# Patient Record
Sex: FEMALE | Race: OTHER | HISPANIC OR LATINO
[De-identification: names, ages, dates, MRNs, and addresses within clinical notes are randomized per-mention and may not be internally consistent; named-entity substitution may affect disease eponyms.]

---

## 2018-07-20 ENCOUNTER — APPOINTMENT (OUTPATIENT)
Dept: VASCULAR SURGERY | Facility: CLINIC | Age: 58
End: 2018-07-20
Payer: MEDICARE

## 2018-07-20 PROCEDURE — 93971 EXTREMITY STUDY: CPT

## 2018-07-20 PROCEDURE — 99204 OFFICE O/P NEW MOD 45 MIN: CPT | Mod: 25

## 2018-12-05 ENCOUNTER — APPOINTMENT (OUTPATIENT)
Dept: ORTHOPEDIC SURGERY | Facility: CLINIC | Age: 58
End: 2018-12-05
Payer: MEDICARE

## 2018-12-05 VITALS
WEIGHT: 200 LBS | DIASTOLIC BLOOD PRESSURE: 87 MMHG | BODY MASS INDEX: 32.14 KG/M2 | SYSTOLIC BLOOD PRESSURE: 141 MMHG | HEIGHT: 66 IN

## 2018-12-05 DIAGNOSIS — M22.8X1 OTHER DISORDERS OF PATELLA, RIGHT KNEE: ICD-10-CM

## 2018-12-05 PROCEDURE — 99203 OFFICE O/P NEW LOW 30 MIN: CPT

## 2018-12-05 PROCEDURE — 73564 X-RAY EXAM KNEE 4 OR MORE: CPT | Mod: LT

## 2018-12-11 ENCOUNTER — APPOINTMENT (OUTPATIENT)
Dept: ORTHOPEDIC SURGERY | Facility: CLINIC | Age: 58
End: 2018-12-11

## 2019-03-18 ENCOUNTER — FORM ENCOUNTER (OUTPATIENT)
Age: 59
End: 2019-03-18

## 2019-03-19 ENCOUNTER — OUTPATIENT (OUTPATIENT)
Dept: OUTPATIENT SERVICES | Facility: HOSPITAL | Age: 59
LOS: 1 days | End: 2019-03-19
Payer: MEDICARE

## 2019-03-19 ENCOUNTER — APPOINTMENT (OUTPATIENT)
Dept: ORTHOPEDIC SURGERY | Facility: CLINIC | Age: 59
End: 2019-03-19
Payer: MEDICARE

## 2019-03-19 DIAGNOSIS — Z96.653 PRESENCE OF ARTIFICIAL KNEE JOINT, BILATERAL: ICD-10-CM

## 2019-03-19 DIAGNOSIS — Z96.659 PAIN DUE TO INTERNAL ORTHOPEDIC PROSTHETIC DEVICES, IMPLANTS AND GRAFTS, INITIAL ENCOUNTER: ICD-10-CM

## 2019-03-19 DIAGNOSIS — T84.84XA PAIN DUE TO INTERNAL ORTHOPEDIC PROSTHETIC DEVICES, IMPLANTS AND GRAFTS, INITIAL ENCOUNTER: ICD-10-CM

## 2019-03-19 DIAGNOSIS — M25.461 EFFUSION, RIGHT KNEE: ICD-10-CM

## 2019-03-19 DIAGNOSIS — Z01.818 ENCOUNTER FOR OTHER PREPROCEDURAL EXAMINATION: ICD-10-CM

## 2019-03-19 LAB
ANION GAP SERPL CALC-SCNC: 13 MMOL/L — SIGNIFICANT CHANGE UP (ref 5–17)
APPEARANCE UR: CLEAR — SIGNIFICANT CHANGE UP
APTT BLD: 29.3 SEC — SIGNIFICANT CHANGE UP (ref 27.5–36.3)
B PERT IGG+IGM PNL SER: SIGNIFICANT CHANGE UP
BACTERIA # UR AUTO: PRESENT /HPF
BILIRUB UR-MCNC: NEGATIVE — SIGNIFICANT CHANGE UP
BUN SERPL-MCNC: 18 MG/DL — SIGNIFICANT CHANGE UP (ref 7–23)
CALCIUM SERPL-MCNC: 10 MG/DL — SIGNIFICANT CHANGE UP (ref 8.4–10.5)
CHLORIDE SERPL-SCNC: 101 MMOL/L — SIGNIFICANT CHANGE UP (ref 96–108)
CO2 SERPL-SCNC: 27 MMOL/L — SIGNIFICANT CHANGE UP (ref 22–31)
COLOR FLD: YELLOW — SIGNIFICANT CHANGE UP
COLOR SPEC: YELLOW — SIGNIFICANT CHANGE UP
CREAT SERPL-MCNC: 0.78 MG/DL — SIGNIFICANT CHANGE UP (ref 0.5–1.3)
CRP SERPL-MCNC: 0.35 MG/DL — SIGNIFICANT CHANGE UP (ref 0–0.4)
DIFF PNL FLD: NEGATIVE — SIGNIFICANT CHANGE UP
EPI CELLS # UR: ABNORMAL /HPF (ref 0–5)
ERYTHROCYTE [SEDIMENTATION RATE] IN BLOOD: 17 MM/HR — SIGNIFICANT CHANGE UP
FLUID INTAKE SUBSTANCE CLASS: SIGNIFICANT CHANGE UP
FLUID SEGMENTED GRANULOCYTES: 40 % — SIGNIFICANT CHANGE UP
GLUCOSE SERPL-MCNC: 141 MG/DL — HIGH (ref 70–99)
GLUCOSE UR QL: NEGATIVE — SIGNIFICANT CHANGE UP
HBA1C BLD-MCNC: 5.6 % — SIGNIFICANT CHANGE UP (ref 4–5.6)
HCT VFR BLD CALC: 41.9 % — SIGNIFICANT CHANGE UP (ref 34.5–45)
HGB BLD-MCNC: 13.3 G/DL — SIGNIFICANT CHANGE UP (ref 11.5–15.5)
INR BLD: 1.02 — SIGNIFICANT CHANGE UP (ref 0.88–1.16)
KETONES UR-MCNC: NEGATIVE — SIGNIFICANT CHANGE UP
LEUKOCYTE ESTERASE UR-ACNC: NEGATIVE — SIGNIFICANT CHANGE UP
LYMPHOCYTES # FLD: 53 % — SIGNIFICANT CHANGE UP
MCHC RBC-ENTMCNC: 28.4 PG — SIGNIFICANT CHANGE UP (ref 27–34)
MCHC RBC-ENTMCNC: 31.7 GM/DL — LOW (ref 32–36)
MCV RBC AUTO: 89.3 FL — SIGNIFICANT CHANGE UP (ref 80–100)
MONOS+MACROS # FLD: 7 % — SIGNIFICANT CHANGE UP
NITRITE UR-MCNC: NEGATIVE — SIGNIFICANT CHANGE UP
NRBC # BLD: 0 /100 WBCS — SIGNIFICANT CHANGE UP (ref 0–0)
PH UR: 6.5 — SIGNIFICANT CHANGE UP (ref 5–8)
PLATELET # BLD AUTO: 350 K/UL — SIGNIFICANT CHANGE UP (ref 150–400)
POTASSIUM SERPL-MCNC: 4 MMOL/L — SIGNIFICANT CHANGE UP (ref 3.5–5.3)
POTASSIUM SERPL-SCNC: 4 MMOL/L — SIGNIFICANT CHANGE UP (ref 3.5–5.3)
PROT UR-MCNC: ABNORMAL MG/DL
PROTHROM AB SERPL-ACNC: 11.5 SEC — SIGNIFICANT CHANGE UP (ref 10–12.9)
RBC # BLD: 4.69 M/UL — SIGNIFICANT CHANGE UP (ref 3.8–5.2)
RBC # FLD: 13.4 % — SIGNIFICANT CHANGE UP (ref 10.3–14.5)
RBC CASTS # UR COMP ASSIST: < 5 /HPF — SIGNIFICANT CHANGE UP
RCV VOL RI: 2000 /UL — HIGH (ref 0–5)
SODIUM SERPL-SCNC: 141 MMOL/L — SIGNIFICANT CHANGE UP (ref 135–145)
SP GR SPEC: 1.01 — SIGNIFICANT CHANGE UP (ref 1–1.03)
SPECIMEN SOURCE FLD: SIGNIFICANT CHANGE UP
SYNOVIAL CRYSTALS CLARITY: SIGNIFICANT CHANGE UP
SYNOVIAL CRYSTALS COLOR: YELLOW
SYNOVIAL CRYSTALS ID: SIGNIFICANT CHANGE UP
SYNOVIAL CRYSTALS TUBE: SIGNIFICANT CHANGE UP
TOTAL NUCLEATED CELL COUNT, BODY FLUID: 7071 /UL — HIGH (ref 0–5)
TUBE TYPE: SIGNIFICANT CHANGE UP
UROBILINOGEN FLD QL: 1 E.U./DL — SIGNIFICANT CHANGE UP
WBC # BLD: 8.62 K/UL — SIGNIFICANT CHANGE UP (ref 3.8–10.5)
WBC # FLD AUTO: 8.62 K/UL — SIGNIFICANT CHANGE UP (ref 3.8–10.5)
WBC UR QL: < 5 /HPF — SIGNIFICANT CHANGE UP

## 2019-03-19 PROCEDURE — 93005 ELECTROCARDIOGRAM TRACING: CPT

## 2019-03-19 PROCEDURE — 85730 THROMBOPLASTIN TIME PARTIAL: CPT

## 2019-03-19 PROCEDURE — 86140 C-REACTIVE PROTEIN: CPT

## 2019-03-19 PROCEDURE — 85610 PROTHROMBIN TIME: CPT

## 2019-03-19 PROCEDURE — 20610 DRAIN/INJ JOINT/BURSA W/O US: CPT | Mod: LT

## 2019-03-19 PROCEDURE — 71046 X-RAY EXAM CHEST 2 VIEWS: CPT | Mod: 26

## 2019-03-19 PROCEDURE — 81001 URINALYSIS AUTO W/SCOPE: CPT

## 2019-03-19 PROCEDURE — 80048 BASIC METABOLIC PNL TOTAL CA: CPT

## 2019-03-19 PROCEDURE — 93010 ELECTROCARDIOGRAM REPORT: CPT

## 2019-03-19 PROCEDURE — 72170 X-RAY EXAM OF PELVIS: CPT | Mod: 26

## 2019-03-19 PROCEDURE — 87086 URINE CULTURE/COLONY COUNT: CPT

## 2019-03-19 PROCEDURE — 73564 X-RAY EXAM KNEE 4 OR MORE: CPT | Mod: 26,50

## 2019-03-19 PROCEDURE — 83036 HEMOGLOBIN GLYCOSYLATED A1C: CPT

## 2019-03-19 PROCEDURE — 72170 X-RAY EXAM OF PELVIS: CPT

## 2019-03-19 PROCEDURE — 73564 X-RAY EXAM KNEE 4 OR MORE: CPT

## 2019-03-19 PROCEDURE — 71046 X-RAY EXAM CHEST 2 VIEWS: CPT

## 2019-03-19 PROCEDURE — 99214 OFFICE O/P EST MOD 30 MIN: CPT | Mod: 25

## 2019-03-19 PROCEDURE — 85652 RBC SED RATE AUTOMATED: CPT

## 2019-03-19 PROCEDURE — 85027 COMPLETE CBC AUTOMATED: CPT

## 2019-03-19 NOTE — END OF VISIT
[FreeTextEntry3] : All medical record entries made by the Scribe were at my, Dr. Ortega's, discretion and personally dictated by me on 03/19/2019. I have reviewed the chart and agree that the record accurately reflects my personal performance of the history, physical exam, assessment and plan. I have also personally directed, reviewed and agreed to the chart.

## 2019-03-19 NOTE — HISTORY OF PRESENT ILLNESS
[de-identified] : 57 y/o F presents today for evaluation of bilateral knee pain s/p bilateral TKR about 10 years ago. The right knee was initially more painful, but patient notes that the left knee has become more painful since January 2019. She reports moderate to severe bilateral knee pain as well as instability on stairs. In the right knee she reports mild stiffness and in the left knee, she reports severe stiffness. She can walk about 2 blocks with a moderate limp and a cane and uses a rail to ascend and descend stairs. She denies fevers, chills. \par Of note, patient has rheumatoid arthritis for which she takes methotrexate and Enbrel. She is allergic to penicillin. Patient has HTN and HLD and reports that they are currently under control.

## 2019-03-19 NOTE — ADDENDUM
[FreeTextEntry1] : This note was written by Carmelina Hooper on 03/19/2019  acting as scribe for Dr. Ortega and DEVORAH Almanzar.\par

## 2019-03-19 NOTE — PROCEDURE
[Aspiration] : Aspiration [Left] : of the left [Knee Joint] : knee joint [Diagnostic] : Diagnostic [Patient] : patient [Risk] : risk [Benefits] : benefits [Alternatives] : alternatives [Bleeding] : bleeding [Allergic Reaction] : allergic reaction [Infection] : infection [Verbal Consent Obtained] : verbal consent was obtained prior to the procedure [Alcohol] : Alcohol [Betadine] : Betadine [Ethyl Chloride Spray] : ethyl chloride spray was used as a topical anesthetic [Lateral] : lateral [18] : an 18-gauge [___ mL Fluid] : [unfilled] mL of [Same Needle/New Syringe] : the syringe was changed and the same needle was left in place and [Bandage Applied] : a bandage [Tolerated Well] : The patient tolerated the procedure well [None] : none [No Strenuous Activity___day(s)] : avoid strenuous activity for [unfilled] day(s) [PRN] : as needed

## 2019-03-19 NOTE — DISCUSSION/SUMMARY
[de-identified] : Ms. Diaz presents with bilateral knee pain and instability s/p bilateral TKR. Based on X-ray imaging and physical exam, I believe the plastic in her bilateral knee implants is wearing out and causing inflammation as a response to the plastic debris. I informed her that in the left knee, there is evidence of swelling and inflammation whereas in the right knee, there is evidence that the implant is already starting to loosen from the bone. I believe that both knees will eventually need surgical revisions. \par \par On physical exam, patient's left knee is slightly warm to the touch and she reports that her left knee pain has recently and quickly increased in severity. I aspirated 17mL of fluid from the left knee and sent it to the lab. I also sent the patient for blood tests to rule out infection. If the blood tests suggest infection, I will also aspirate the right knee to check for infection there when she returns to the office in two weeks\par \par I informed the patient that we will make a plan after her aspiration and blood tests results, but I believe she will need a revision of left total knee replacement. We may discuss which parts of the left TKR to replace but the ultimate decision will be made in the operating room.\par \par Ms. Diaz currently takes Enbrel for rheumatoid arthritis and I reminded her that because this medication can suppress one's immune system.  As she will need an operation she will have to stop taking this medication for one month before surgery in order to prevent infection. Enbrel should not be resumed until approximately 1 month after surgery.\par \par Follow up in 2 weeks to review lab results and make a surgical plan.

## 2019-03-19 NOTE — PHYSICAL EXAM
[de-identified] : Constitutional: Well appearing. No acute distress.\par Mental Status: Alert & oriented to person, place and time. Normal affect.\par Pulmonary: No respiratory distress. Normal chest excursion.\par \par Gait: Normal.\par Ambulatory assist devices: None.\par \par Cervical spine: Skin intact. No visible deformity. Painless active ROM without evident restriction.\par Bilateral upper extremities: Skin intact. No deformity. Painless active ROM without evident restriction.\par Thoracolumbar spine: No deformity. No tenderness. No radicular pain on passive straight leg raise bilaterally.\par Pelvis: No pelvic obliquity. No tenderness.\par Leg lengths: Equal.\par Bilateral Hips: No swelling or deformity. No focal tenderness. Painless and unrestricted range of motion. No crepitation.\par \par Right Knee:\par Skin intact.\par Well healed surgical scar.\par No erythema or ecchymosis.\par (+) effusion.\par Diffuse tenderness.\par Painless ROM from full extension to 105-110 degrees of flexion.\par Central patellar tracking.\par No crepitation.\par Moderate coronal laxity to varus and valgus stress. \par Laxity on drawer testing.\par Significant lateral lift off with varus stress and flexion.\par \par Left Knee:\par Skin intact.\par Well healed surgical scar.\par No erythema or ecchymosis.\par (+) effusion.\par No deformity.\par Diffuse tenderness.\par Painful ROM from full extension to 75-80 degrees of flexion. Tenseness and guarding.\par Some laxity, when guarding stops.\par Central patellar tracking.\par No crepitation.\par Slight warmth.\par \par Neurological: Intact distal crude touch sensation. Normal distal motor power. \par Cardiovascular: Palpable dorsalis pedis and posterior tibialis pulses. Brisk capillary refill. No peripheral edema.\par Lymphatics: No peripheral adenopathy appreciated. [de-identified] : X-ray imaging of the bilateral knees done here today shows bilateral total knee arthroplasties with evidence of synovitis and effusion, both femoral components slightly oversized, osteolysis around right tibial component.\par

## 2019-03-20 DIAGNOSIS — M25.462 EFFUSION, LEFT KNEE: ICD-10-CM

## 2019-03-20 LAB
CULTURE RESULTS: SIGNIFICANT CHANGE UP
GRAM STN FLD: SIGNIFICANT CHANGE UP
SPECIMEN SOURCE: SIGNIFICANT CHANGE UP

## 2019-03-22 LAB
CULTURE RESULTS: NO GROWTH — SIGNIFICANT CHANGE UP
SPECIMEN SOURCE: SIGNIFICANT CHANGE UP

## 2019-04-04 ENCOUNTER — APPOINTMENT (OUTPATIENT)
Dept: ORTHOPEDIC SURGERY | Facility: CLINIC | Age: 59
End: 2019-04-04

## 2019-04-22 ENCOUNTER — OUTPATIENT (OUTPATIENT)
Dept: OUTPATIENT SERVICES | Facility: HOSPITAL | Age: 59
LOS: 1 days | End: 2019-04-22
Payer: COMMERCIAL

## 2019-04-22 DIAGNOSIS — Z22.321 CARRIER OR SUSPECTED CARRIER OF METHICILLIN SUSCEPTIBLE STAPHYLOCOCCUS AUREUS: ICD-10-CM

## 2019-04-22 LAB
MRSA PCR RESULT.: NEGATIVE — SIGNIFICANT CHANGE UP
S AUREUS DNA NOSE QL NAA+PROBE: NEGATIVE — SIGNIFICANT CHANGE UP

## 2019-04-22 PROCEDURE — 87641 MR-STAPH DNA AMP PROBE: CPT

## 2019-04-25 VITALS
RESPIRATION RATE: 20 BRPM | WEIGHT: 199.3 LBS | HEART RATE: 76 BPM | HEIGHT: 66 IN | SYSTOLIC BLOOD PRESSURE: 117 MMHG | TEMPERATURE: 98 F | OXYGEN SATURATION: 98 % | DIASTOLIC BLOOD PRESSURE: 83 MMHG

## 2019-04-26 NOTE — PATIENT PROFILE ADULT - DO YOU FEEL UNSAFE AT HOME, WORK, OR SCHOOL?
47 y/o female with vaginal bleeding. Plan: transvaginal US, f/u OB/GYN, IV fluids. 45 y/o female with vaginal bleeding. Plan: transvaginal US, f/u OB/GYN, IV fluids.    Cleared by GYN Dr. Reeves for DC with DR. Fournier at [facility]. Maricel Domingo PA-C no

## 2019-04-26 NOTE — PATIENT PROFILE ADULT - NSPROEDALEARNPREF_GEN_A_NUR
individual instruction written material/individual instruction/verbal instruction/skill demonstration

## 2019-04-29 ENCOUNTER — APPOINTMENT (OUTPATIENT)
Dept: ORTHOPEDIC SURGERY | Facility: HOSPITAL | Age: 59
End: 2019-04-29

## 2019-04-30 PROBLEM — M06.9 RHEUMATOID ARTHRITIS, UNSPECIFIED: Chronic | Status: ACTIVE | Noted: 2019-04-26

## 2019-04-30 PROBLEM — J45.909 UNSPECIFIED ASTHMA, UNCOMPLICATED: Chronic | Status: ACTIVE | Noted: 2019-04-26

## 2019-04-30 PROBLEM — A04.8 OTHER SPECIFIED BACTERIAL INTESTINAL INFECTIONS: Chronic | Status: ACTIVE | Noted: 2019-04-26

## 2019-04-30 PROBLEM — F41.1 GENERALIZED ANXIETY DISORDER: Chronic | Status: ACTIVE | Noted: 2019-04-26

## 2019-04-30 PROBLEM — F32.9 MAJOR DEPRESSIVE DISORDER, SINGLE EPISODE, UNSPECIFIED: Chronic | Status: ACTIVE | Noted: 2019-04-26

## 2019-04-30 PROBLEM — E78.5 HYPERLIPIDEMIA, UNSPECIFIED: Chronic | Status: ACTIVE | Noted: 2019-04-26

## 2019-04-30 PROBLEM — K22.70 BARRETT'S ESOPHAGUS WITHOUT DYSPLASIA: Chronic | Status: ACTIVE | Noted: 2019-04-26

## 2019-04-30 PROBLEM — I10 ESSENTIAL (PRIMARY) HYPERTENSION: Chronic | Status: ACTIVE | Noted: 2019-04-26

## 2019-05-01 ENCOUNTER — INPATIENT (INPATIENT)
Facility: HOSPITAL | Age: 59
LOS: 2 days | Discharge: HOME HEALTH | DRG: 468 | End: 2019-05-04
Attending: ORTHOPAEDIC SURGERY | Admitting: ORTHOPAEDIC SURGERY
Payer: MEDICARE

## 2019-05-01 ENCOUNTER — MEDICATION RENEWAL (OUTPATIENT)
Age: 59
End: 2019-05-01

## 2019-05-01 ENCOUNTER — APPOINTMENT (OUTPATIENT)
Dept: ORTHOPEDIC SURGERY | Facility: HOSPITAL | Age: 59
End: 2019-05-01
Payer: MEDICARE

## 2019-05-01 DIAGNOSIS — J45.909 UNSPECIFIED ASTHMA, UNCOMPLICATED: ICD-10-CM

## 2019-05-01 DIAGNOSIS — F41.1 GENERALIZED ANXIETY DISORDER: ICD-10-CM

## 2019-05-01 DIAGNOSIS — F32.9 MAJOR DEPRESSIVE DISORDER, SINGLE EPISODE, UNSPECIFIED: ICD-10-CM

## 2019-05-01 DIAGNOSIS — E78.5 HYPERLIPIDEMIA, UNSPECIFIED: ICD-10-CM

## 2019-05-01 DIAGNOSIS — Z90.49 ACQUIRED ABSENCE OF OTHER SPECIFIED PARTS OF DIGESTIVE TRACT: Chronic | ICD-10-CM

## 2019-05-01 DIAGNOSIS — I10 ESSENTIAL (PRIMARY) HYPERTENSION: ICD-10-CM

## 2019-05-01 DIAGNOSIS — Z90.710 ACQUIRED ABSENCE OF BOTH CERVIX AND UTERUS: Chronic | ICD-10-CM

## 2019-05-01 DIAGNOSIS — M06.9 RHEUMATOID ARTHRITIS, UNSPECIFIED: ICD-10-CM

## 2019-05-01 DIAGNOSIS — Z41.9 ENCOUNTER FOR PROCEDURE FOR PURPOSES OTHER THAN REMEDYING HEALTH STATE, UNSPECIFIED: Chronic | ICD-10-CM

## 2019-05-01 DIAGNOSIS — Z96.659 PRESENCE OF UNSPECIFIED ARTIFICIAL KNEE JOINT: Chronic | ICD-10-CM

## 2019-05-01 LAB
HCT VFR BLD CALC: 40.4 % — SIGNIFICANT CHANGE UP (ref 34.5–45)
HGB BLD-MCNC: 12.9 G/DL — SIGNIFICANT CHANGE UP (ref 11.5–15.5)
MCHC RBC-ENTMCNC: 28.4 PG — SIGNIFICANT CHANGE UP (ref 27–34)
MCHC RBC-ENTMCNC: 31.9 GM/DL — LOW (ref 32–36)
MCV RBC AUTO: 88.8 FL — SIGNIFICANT CHANGE UP (ref 80–100)
NRBC # BLD: 0 /100 WBCS — SIGNIFICANT CHANGE UP (ref 0–0)
PLATELET # BLD AUTO: 277 K/UL — SIGNIFICANT CHANGE UP (ref 150–400)
RBC # BLD: 4.55 M/UL — SIGNIFICANT CHANGE UP (ref 3.8–5.2)
RBC # FLD: 13.3 % — SIGNIFICANT CHANGE UP (ref 10.3–14.5)
WBC # BLD: 6.43 K/UL — SIGNIFICANT CHANGE UP (ref 3.8–10.5)
WBC # FLD AUTO: 6.43 K/UL — SIGNIFICANT CHANGE UP (ref 3.8–10.5)

## 2019-05-01 PROCEDURE — 73560 X-RAY EXAM OF KNEE 1 OR 2: CPT | Mod: 26,LT

## 2019-05-01 PROCEDURE — 27486 REVISE/REPLACE KNEE JOINT: CPT | Mod: 52,LT

## 2019-05-01 PROCEDURE — 27486 REVISE/REPLACE KNEE JOINT: CPT | Mod: AS,LT

## 2019-05-01 PROCEDURE — 27599 UNLISTED PX FEMUR/KNEE: CPT | Mod: LT

## 2019-05-01 RX ORDER — SERTRALINE 25 MG/1
1 TABLET, FILM COATED ORAL
Qty: 0 | Refills: 0 | COMMUNITY

## 2019-05-01 RX ORDER — LOSARTAN POTASSIUM 100 MG/1
50 TABLET, FILM COATED ORAL DAILY
Qty: 0 | Refills: 0 | Status: DISCONTINUED | OUTPATIENT
Start: 2019-05-02 | End: 2019-05-04

## 2019-05-01 RX ORDER — DOCUSATE SODIUM 100 MG
100 CAPSULE ORAL THREE TIMES A DAY
Qty: 0 | Refills: 0 | Status: DISCONTINUED | OUTPATIENT
Start: 2019-05-01 | End: 2019-05-04

## 2019-05-01 RX ORDER — LOSARTAN/HYDROCHLOROTHIAZIDE 100MG-25MG
1 TABLET ORAL
Qty: 0 | Refills: 0 | COMMUNITY

## 2019-05-01 RX ORDER — KETOROLAC TROMETHAMINE 30 MG/ML
15 SYRINGE (ML) INJECTION EVERY 6 HOURS
Qty: 0 | Refills: 0 | Status: DISCONTINUED | OUTPATIENT
Start: 2019-05-01 | End: 2019-05-03

## 2019-05-01 RX ORDER — ATORVASTATIN CALCIUM 80 MG/1
20 TABLET, FILM COATED ORAL AT BEDTIME
Qty: 0 | Refills: 0 | Status: DISCONTINUED | OUTPATIENT
Start: 2019-05-02 | End: 2019-05-04

## 2019-05-01 RX ORDER — HYDROCHLOROTHIAZIDE 25 MG
12.5 TABLET ORAL DAILY
Qty: 0 | Refills: 0 | Status: DISCONTINUED | OUTPATIENT
Start: 2019-05-02 | End: 2019-05-04

## 2019-05-01 RX ORDER — POLYETHYLENE GLYCOL 3350 17 G/17G
17 POWDER, FOR SOLUTION ORAL DAILY
Qty: 0 | Refills: 0 | Status: DISCONTINUED | OUTPATIENT
Start: 2019-05-01 | End: 2019-05-04

## 2019-05-01 RX ORDER — ERGOCALCIFEROL 1.25 MG/1
1 CAPSULE ORAL
Qty: 0 | Refills: 0 | COMMUNITY

## 2019-05-01 RX ORDER — SODIUM CHLORIDE 9 MG/ML
1000 INJECTION, SOLUTION INTRAVENOUS
Qty: 0 | Refills: 0 | Status: DISCONTINUED | OUTPATIENT
Start: 2019-05-01 | End: 2019-05-04

## 2019-05-01 RX ORDER — ALBUTEROL 90 UG/1
2 AEROSOL, METERED ORAL
Qty: 0 | Refills: 0 | COMMUNITY

## 2019-05-01 RX ORDER — ALBUTEROL 90 UG/1
2 AEROSOL, METERED ORAL EVERY 6 HOURS
Qty: 0 | Refills: 0 | Status: DISCONTINUED | OUTPATIENT
Start: 2019-05-02 | End: 2019-05-04

## 2019-05-01 RX ORDER — ACETAMINOPHEN 500 MG
650 TABLET ORAL EVERY 6 HOURS
Qty: 0 | Refills: 0 | Status: DISCONTINUED | OUTPATIENT
Start: 2019-05-01 | End: 2019-05-04

## 2019-05-01 RX ORDER — MORPHINE SULFATE 50 MG/1
4 CAPSULE, EXTENDED RELEASE ORAL
Qty: 0 | Refills: 0 | Status: COMPLETED | OUTPATIENT
Start: 2019-05-01 | End: 2019-05-01

## 2019-05-01 RX ORDER — SENNA PLUS 8.6 MG/1
2 TABLET ORAL AT BEDTIME
Qty: 0 | Refills: 0 | Status: DISCONTINUED | OUTPATIENT
Start: 2019-05-01 | End: 2019-05-04

## 2019-05-01 RX ORDER — OXYCODONE HYDROCHLORIDE 5 MG/1
10 TABLET ORAL EVERY 4 HOURS
Qty: 0 | Refills: 0 | Status: DISCONTINUED | OUTPATIENT
Start: 2019-05-02 | End: 2019-05-04

## 2019-05-01 RX ORDER — OXYCODONE HYDROCHLORIDE 5 MG/1
5 TABLET ORAL EVERY 4 HOURS
Qty: 0 | Refills: 0 | Status: DISCONTINUED | OUTPATIENT
Start: 2019-05-01 | End: 2019-05-04

## 2019-05-01 RX ORDER — ROSUVASTATIN CALCIUM 5 MG/1
1 TABLET ORAL
Qty: 0 | Refills: 0 | COMMUNITY

## 2019-05-01 RX ORDER — PANTOPRAZOLE 40 MG/1
40 TABLET, DELAYED RELEASE ORAL DAILY
Qty: 30 | Refills: 0 | Status: ACTIVE | COMMUNITY
Start: 2019-05-01 | End: 1900-01-01

## 2019-05-01 RX ORDER — CELECOXIB 200 MG/1
200 CAPSULE ORAL
Qty: 0 | Refills: 0 | Status: DISCONTINUED | OUTPATIENT
Start: 2019-05-03 | End: 2019-05-04

## 2019-05-01 RX ORDER — ONDANSETRON 8 MG/1
4 TABLET, FILM COATED ORAL EVERY 6 HOURS
Qty: 0 | Refills: 0 | Status: DISCONTINUED | OUTPATIENT
Start: 2019-05-01 | End: 2019-05-04

## 2019-05-01 RX ORDER — MAGNESIUM HYDROXIDE 400 MG/1
30 TABLET, CHEWABLE ORAL DAILY
Qty: 0 | Refills: 0 | Status: DISCONTINUED | OUTPATIENT
Start: 2019-05-01 | End: 2019-05-04

## 2019-05-01 RX ORDER — CELECOXIB 200 MG/1
400 CAPSULE ORAL ONCE
Qty: 0 | Refills: 0 | Status: COMPLETED | OUTPATIENT
Start: 2019-05-01 | End: 2019-05-01

## 2019-05-01 RX ORDER — ACETAMINOPHEN 500 MG
1000 TABLET ORAL ONCE
Qty: 0 | Refills: 0 | Status: COMPLETED | OUTPATIENT
Start: 2019-05-01 | End: 2019-05-01

## 2019-05-01 RX ORDER — APREPITANT 80 MG/1
40 CAPSULE ORAL ONCE
Qty: 0 | Refills: 0 | Status: COMPLETED | OUTPATIENT
Start: 2019-05-01 | End: 2019-05-01

## 2019-05-01 RX ORDER — BUPIVACAINE 13.3 MG/ML
20 INJECTION, SUSPENSION, LIPOSOMAL INFILTRATION ONCE
Qty: 0 | Refills: 0 | Status: DISCONTINUED | OUTPATIENT
Start: 2019-05-01 | End: 2019-05-04

## 2019-05-01 RX ORDER — ASPIRIN/CALCIUM CARB/MAGNESIUM 324 MG
325 TABLET ORAL
Qty: 0 | Refills: 0 | Status: DISCONTINUED | OUTPATIENT
Start: 2019-05-01 | End: 2019-05-04

## 2019-05-01 RX ORDER — ERGOCALCIFEROL 1.25 MG/1
50000 CAPSULE ORAL
Qty: 0 | Refills: 0 | Status: DISCONTINUED | OUTPATIENT
Start: 2019-05-02 | End: 2019-05-04

## 2019-05-01 RX ORDER — PANTOPRAZOLE SODIUM 20 MG/1
40 TABLET, DELAYED RELEASE ORAL
Qty: 0 | Refills: 0 | Status: DISCONTINUED | OUTPATIENT
Start: 2019-05-01 | End: 2019-05-04

## 2019-05-01 RX ORDER — SODIUM CHLORIDE 9 MG/ML
3 INJECTION INTRAMUSCULAR; INTRAVENOUS; SUBCUTANEOUS EVERY 8 HOURS
Qty: 0 | Refills: 0 | Status: DISCONTINUED | OUTPATIENT
Start: 2019-05-01 | End: 2019-05-04

## 2019-05-01 RX ORDER — SERTRALINE 25 MG/1
100 TABLET, FILM COATED ORAL DAILY
Qty: 0 | Refills: 0 | Status: DISCONTINUED | OUTPATIENT
Start: 2019-05-02 | End: 2019-05-04

## 2019-05-01 RX ORDER — MORPHINE SULFATE 50 MG/1
2 CAPSULE, EXTENDED RELEASE ORAL EVERY 4 HOURS
Qty: 0 | Refills: 0 | Status: DISCONTINUED | OUTPATIENT
Start: 2019-05-02 | End: 2019-05-04

## 2019-05-01 RX ADMIN — Medication 100 MILLIGRAM(S): at 23:03

## 2019-05-01 RX ADMIN — OXYCODONE HYDROCHLORIDE 5 MILLIGRAM(S): 5 TABLET ORAL at 21:07

## 2019-05-01 RX ADMIN — SODIUM CHLORIDE 100 MILLILITER(S): 9 INJECTION, SOLUTION INTRAVENOUS at 19:56

## 2019-05-01 RX ADMIN — SODIUM CHLORIDE 3 MILLILITER(S): 9 INJECTION INTRAMUSCULAR; INTRAVENOUS; SUBCUTANEOUS at 22:56

## 2019-05-01 RX ADMIN — CELECOXIB 400 MILLIGRAM(S): 200 CAPSULE ORAL at 13:10

## 2019-05-01 RX ADMIN — Medication 1000 MILLIGRAM(S): at 13:11

## 2019-05-01 RX ADMIN — APREPITANT 40 MILLIGRAM(S): 80 CAPSULE ORAL at 13:11

## 2019-05-01 RX ADMIN — Medication 15 MILLIGRAM(S): at 23:03

## 2019-05-01 RX ADMIN — Medication 15 MILLIGRAM(S): at 20:06

## 2019-05-01 RX ADMIN — MORPHINE SULFATE 4 MILLIGRAM(S): 50 CAPSULE, EXTENDED RELEASE ORAL at 20:50

## 2019-05-01 RX ADMIN — Medication 650 MILLIGRAM(S): at 23:03

## 2019-05-01 NOTE — H&P ADULT - NSHPLABSRESULTS_GEN_ALL_CORE
preop cbc/bmp/coags/ua wnl per medical clearance   EKG-   CXR- preop cbc/bmp/coags/ua wnl per medical clearance   EKG-  NSR, RSR in V1  CXR- wnl per medical clearance  nares negative

## 2019-05-01 NOTE — PHYSICAL THERAPY INITIAL EVALUATION ADULT - PLANNED THERAPY INTERVENTIONS, PT EVAL
ROM/neuromuscular re-education/balance training/bed mobility training/strengthening/transfer training/gait training

## 2019-05-01 NOTE — H&P ADULT - NSICDXPASTMEDICALHX_GEN_ALL_CORE_FT
PAST MEDICAL HISTORY:  Asthma     Sotomayor's esophagus     Generalized anxiety disorder     Helicobacter pylori (H. pylori) infection     HLD (hyperlipidemia)     HTN (hypertension)     Major depressive disorder     RA (rheumatoid arthritis)

## 2019-05-01 NOTE — PHYSICAL THERAPY INITIAL EVALUATION ADULT - CRITERIA FOR SKILLED THERAPEUTIC INTERVENTIONS
anticipated equipment needs at discharge/therapy frequency/anticipated discharge recommendation/impairments found/risk reduction/prevention/functional limitations in following categories/predicted duration of therapy intervention

## 2019-05-01 NOTE — PHYSICAL THERAPY INITIAL EVALUATION ADULT - GENERAL OBSERVATIONS, REHAB EVAL
Patient received semi-supine in no acute distress, +Telemetry, +SCDs, +IV, +Cryocuff. Cleared by MARIA LUISA Johnston.

## 2019-05-01 NOTE — H&P ADULT - HISTORY OF PRESENT ILLNESS
57yo f c/o left knee pain x   Presents today for elective REVISION LEFT TKR. 59yo f c/o left knee pain on/off since 2006. Pt. had original left tkr done at [facility] in 2006. Pt. c/o left knee pain, swelling, and giving out on her. Pt. went to see PMD in which xrays were performed and referred to orthopedist. Pt. eventually was referred to Dr. Ortega in which he aspirated left knee and was told the prosthesis was worn out. No infection from aspiration results. Denies any numbness/tingling in b/l les. Pt. is using cane assistance and can ambulate greater than 2 blocks after aspiration.   Presents today for elective REVISION LEFT TKR.

## 2019-05-01 NOTE — H&P ADULT - NSICDXPASTSURGICALHX_GEN_ALL_CORE_FT
PAST SURGICAL HISTORY:  S/P appendectomy     S/P cholecystectomy     S/P hysterectomy total    S/P knee replacement bilateral    Surgery, elective right shoulder surgery

## 2019-05-01 NOTE — PHYSICAL THERAPY INITIAL EVALUATION ADULT - PERTINENT HX OF CURRENT PROBLEM, REHAB EVAL
59yo f c/o left knee pain on/off since 2006. Pt. had original left tkr done at [facility] in 2006. Pt. c/o left knee pain, swelling, and giving out on her. Pt. went to see PMD in which xrays were performed and referred to orthopedist. Pt. eventually was referred to Dr. Ortega in which he aspirated left knee and was told the prosthesis was worn out. No infection from aspiration results. Denies any numbness/tingling in b/l les.

## 2019-05-01 NOTE — PHYSICAL THERAPY INITIAL EVALUATION ADULT - ADDITIONAL COMMENTS
Patient lives with spouse in apartment building with 27 steps to enter. Ambulates with SC recently. Denies falls.

## 2019-05-02 ENCOUNTER — MEDICATION RENEWAL (OUTPATIENT)
Age: 59
End: 2019-05-02

## 2019-05-02 ENCOUNTER — TRANSCRIPTION ENCOUNTER (OUTPATIENT)
Age: 59
End: 2019-05-02

## 2019-05-02 LAB
ANION GAP SERPL CALC-SCNC: 16 MMOL/L — SIGNIFICANT CHANGE UP (ref 5–17)
BUN SERPL-MCNC: 13 MG/DL — SIGNIFICANT CHANGE UP (ref 7–23)
CALCIUM SERPL-MCNC: 9.4 MG/DL — SIGNIFICANT CHANGE UP (ref 8.4–10.5)
CHLORIDE SERPL-SCNC: 103 MMOL/L — SIGNIFICANT CHANGE UP (ref 96–108)
CO2 SERPL-SCNC: 19 MMOL/L — LOW (ref 22–31)
CREAT SERPL-MCNC: 0.63 MG/DL — SIGNIFICANT CHANGE UP (ref 0.5–1.3)
GLUCOSE SERPL-MCNC: 210 MG/DL — HIGH (ref 70–99)
HCT VFR BLD CALC: 39.3 % — SIGNIFICANT CHANGE UP (ref 34.5–45)
HCV AB S/CO SERPL IA: 0.05 S/CO — SIGNIFICANT CHANGE UP
HCV AB SERPL-IMP: SIGNIFICANT CHANGE UP
HGB BLD-MCNC: 12.3 G/DL — SIGNIFICANT CHANGE UP (ref 11.5–15.5)
MCHC RBC-ENTMCNC: 28.1 PG — SIGNIFICANT CHANGE UP (ref 27–34)
MCHC RBC-ENTMCNC: 31.3 GM/DL — LOW (ref 32–36)
MCV RBC AUTO: 89.7 FL — SIGNIFICANT CHANGE UP (ref 80–100)
NRBC # BLD: 0 /100 WBCS — SIGNIFICANT CHANGE UP (ref 0–0)
PLATELET # BLD AUTO: 293 K/UL — SIGNIFICANT CHANGE UP (ref 150–400)
POTASSIUM SERPL-MCNC: 4 MMOL/L — SIGNIFICANT CHANGE UP (ref 3.5–5.3)
POTASSIUM SERPL-SCNC: 4 MMOL/L — SIGNIFICANT CHANGE UP (ref 3.5–5.3)
RBC # BLD: 4.38 M/UL — SIGNIFICANT CHANGE UP (ref 3.8–5.2)
RBC # FLD: 13.2 % — SIGNIFICANT CHANGE UP (ref 10.3–14.5)
SODIUM SERPL-SCNC: 138 MMOL/L — SIGNIFICANT CHANGE UP (ref 135–145)
WBC # BLD: 10.16 K/UL — SIGNIFICANT CHANGE UP (ref 3.8–10.5)
WBC # FLD AUTO: 10.16 K/UL — SIGNIFICANT CHANGE UP (ref 3.8–10.5)

## 2019-05-02 PROCEDURE — 99221 1ST HOSP IP/OBS SF/LOW 40: CPT

## 2019-05-02 RX ORDER — SENNA PLUS 8.6 MG/1
2 TABLET ORAL
Qty: 0 | Refills: 0 | COMMUNITY
Start: 2019-05-02

## 2019-05-02 RX ORDER — RANITIDINE HYDROCHLORIDE 150 MG/1
0 TABLET, FILM COATED ORAL
Qty: 0 | Refills: 0 | COMMUNITY

## 2019-05-02 RX ORDER — ACETAMINOPHEN 500 MG
2 TABLET ORAL
Qty: 0 | Refills: 0 | COMMUNITY
Start: 2019-05-02

## 2019-05-02 RX ORDER — PANTOPRAZOLE SODIUM 20 MG/1
1 TABLET, DELAYED RELEASE ORAL
Qty: 0 | Refills: 0 | COMMUNITY
Start: 2019-05-02

## 2019-05-02 RX ORDER — CELECOXIB 200 MG/1
1 CAPSULE ORAL
Qty: 0 | Refills: 0 | COMMUNITY
Start: 2019-05-02

## 2019-05-02 RX ORDER — ASPIRIN/CALCIUM CARB/MAGNESIUM 324 MG
1 TABLET ORAL
Qty: 0 | Refills: 0 | COMMUNITY
Start: 2019-05-02

## 2019-05-02 RX ORDER — POLYETHYLENE GLYCOL 3350 17 G/17G
17 POWDER, FOR SOLUTION ORAL
Qty: 0 | Refills: 0 | COMMUNITY
Start: 2019-05-02

## 2019-05-02 RX ORDER — VANCOMYCIN HCL 1 G
VIAL (EA) INTRAVENOUS
Qty: 0 | Refills: 0 | Status: DISCONTINUED | OUTPATIENT
Start: 2019-05-02 | End: 2019-05-02

## 2019-05-02 RX ORDER — DOCUSATE SODIUM 100 MG
1 CAPSULE ORAL
Qty: 0 | Refills: 0 | COMMUNITY
Start: 2019-05-02

## 2019-05-02 RX ORDER — VANCOMYCIN HCL 1 G
1500 VIAL (EA) INTRAVENOUS ONCE
Qty: 0 | Refills: 0 | Status: COMPLETED | OUTPATIENT
Start: 2019-05-02 | End: 2019-05-02

## 2019-05-02 RX ADMIN — Medication 100 MILLIGRAM(S): at 05:43

## 2019-05-02 RX ADMIN — Medication 650 MILLIGRAM(S): at 18:23

## 2019-05-02 RX ADMIN — Medication 15 MILLIGRAM(S): at 05:43

## 2019-05-02 RX ADMIN — Medication 100 MILLIGRAM(S): at 22:02

## 2019-05-02 RX ADMIN — SODIUM CHLORIDE 3 MILLILITER(S): 9 INJECTION INTRAMUSCULAR; INTRAVENOUS; SUBCUTANEOUS at 05:45

## 2019-05-02 RX ADMIN — Medication 650 MILLIGRAM(S): at 22:38

## 2019-05-02 RX ADMIN — ATORVASTATIN CALCIUM 20 MILLIGRAM(S): 80 TABLET, FILM COATED ORAL at 22:02

## 2019-05-02 RX ADMIN — Medication 15 MILLIGRAM(S): at 18:23

## 2019-05-02 RX ADMIN — OXYCODONE HYDROCHLORIDE 10 MILLIGRAM(S): 5 TABLET ORAL at 19:00

## 2019-05-02 RX ADMIN — Medication 300 MILLIGRAM(S): at 03:55

## 2019-05-02 RX ADMIN — SODIUM CHLORIDE 3 MILLILITER(S): 9 INJECTION INTRAMUSCULAR; INTRAVENOUS; SUBCUTANEOUS at 11:17

## 2019-05-02 RX ADMIN — LOSARTAN POTASSIUM 50 MILLIGRAM(S): 100 TABLET, FILM COATED ORAL at 05:43

## 2019-05-02 RX ADMIN — Medication 15 MILLIGRAM(S): at 12:00

## 2019-05-02 RX ADMIN — Medication 325 MILLIGRAM(S): at 18:22

## 2019-05-02 RX ADMIN — Medication 15 MILLIGRAM(S): at 11:21

## 2019-05-02 RX ADMIN — Medication 650 MILLIGRAM(S): at 11:20

## 2019-05-02 RX ADMIN — Medication 15 MILLIGRAM(S): at 19:00

## 2019-05-02 RX ADMIN — Medication 12.5 MILLIGRAM(S): at 05:45

## 2019-05-02 RX ADMIN — Medication 650 MILLIGRAM(S): at 00:03

## 2019-05-02 RX ADMIN — POLYETHYLENE GLYCOL 3350 17 GRAM(S): 17 POWDER, FOR SOLUTION ORAL at 11:20

## 2019-05-02 RX ADMIN — OXYCODONE HYDROCHLORIDE 10 MILLIGRAM(S): 5 TABLET ORAL at 18:31

## 2019-05-02 RX ADMIN — Medication 650 MILLIGRAM(S): at 05:43

## 2019-05-02 RX ADMIN — Medication 650 MILLIGRAM(S): at 19:00

## 2019-05-02 RX ADMIN — Medication 650 MILLIGRAM(S): at 06:43

## 2019-05-02 RX ADMIN — Medication 325 MILLIGRAM(S): at 05:43

## 2019-05-02 RX ADMIN — Medication 15 MILLIGRAM(S): at 00:03

## 2019-05-02 RX ADMIN — Medication 650 MILLIGRAM(S): at 22:08

## 2019-05-02 RX ADMIN — Medication 15 MILLIGRAM(S): at 06:43

## 2019-05-02 RX ADMIN — ERGOCALCIFEROL 50000 UNIT(S): 1.25 CAPSULE ORAL at 11:21

## 2019-05-02 RX ADMIN — SERTRALINE 100 MILLIGRAM(S): 25 TABLET, FILM COATED ORAL at 11:21

## 2019-05-02 RX ADMIN — OXYCODONE HYDROCHLORIDE 10 MILLIGRAM(S): 5 TABLET ORAL at 09:52

## 2019-05-02 RX ADMIN — PANTOPRAZOLE SODIUM 40 MILLIGRAM(S): 20 TABLET, DELAYED RELEASE ORAL at 05:44

## 2019-05-02 RX ADMIN — OXYCODONE HYDROCHLORIDE 10 MILLIGRAM(S): 5 TABLET ORAL at 10:20

## 2019-05-02 RX ADMIN — Medication 650 MILLIGRAM(S): at 12:00

## 2019-05-02 RX ADMIN — Medication 100 MILLIGRAM(S): at 11:37

## 2019-05-02 RX ADMIN — SODIUM CHLORIDE 3 MILLILITER(S): 9 INJECTION INTRAMUSCULAR; INTRAVENOUS; SUBCUTANEOUS at 22:04

## 2019-05-02 NOTE — PROGRESS NOTE ADULT - SUBJECTIVE AND OBJECTIVE BOX
Ortho Post Op Check    Procedure: Revision L TKA  Surgeon: Aubreytall     Pt comfortable without complaints, pain controlled  Denies CP, SOB, N/V, numbness/tingling     Vital Signs Last 24 Hrs  T(C): 36.4 (05-01-19 @ 21:32), Max: 36.4 (05-01-19 @ 19:57)  T(F): 97.5 (05-01-19 @ 21:32), Max: 97.5 (05-01-19 @ 19:57)  HR: 73 (05-01-19 @ 21:32) (54 - 77)  BP: 119/74 (05-01-19 @ 21:32) (102/58 - 140/76)  BP(mean): 88 (05-01-19 @ 19:57) (74 - 88)  RR: 17 (05-01-19 @ 21:32) (13 - 19)  SpO2: 100% (05-01-19 @ 21:32) (95% - 100%)    General: Pt Alert and oriented, NAD  L knee DSG C/D/I, cryo cuff in place  Pulses: 2+ DP  Sensation: s/s/sp/dp/t intact  Motor: EHL/FHL/TA/GS 5/5                          12.9   6.43  )-----------( 277      ( 01 May 2019 17:56 )             40.4           Post-op X-Ray: Hardware in place with good alignment     A/P: 58yFemale POD#0 s/p rev L TKA 5/1  - Stable  - Pain Control  - DVT ppx: ASA  - Post op abx: ancef periop  - PT, WBS: WBAT  - f/u AM labs  - dispo: home v rehab     Ortho Pager [phone]

## 2019-05-02 NOTE — DISCHARGE NOTE PROVIDER - HOSPITAL COURSE
Admitted 5/2/19    Surgery- left total knee revision 5/2/19    Laurie-op Antibiotics    Pain control    DVT prophylaxis    OOB/Physical Therapy

## 2019-05-02 NOTE — CONSULT NOTE ADULT - SUBJECTIVE AND OBJECTIVE BOX
Patient seen and examined, Chart reviewed    HPI:  57yo f with depression, HTN. HLD with c/o left knee pain on/off since 2006. Pt. had original left tkr done at [facility] in 2006. Pt. c/o left knee pain, swelling, and giving out on her. Pt. went to see PMD in which xrays were performed and referred to orthopedist. Pt. eventually was referred to Dr. Ortega in which he aspirated left knee and was told the prosthesis was worn out. No infection from aspiration results. Denies any numbness/tingling in b/l les. Pt. is using cane assistance and can ambulate greater than 2 blocks after aspiration.   He is today post op day#1 from an elective REVISION LEFT TKR.    PAST MEDICAL & SURGICAL HISTORY:  RA (rheumatoid arthritis)  Major depressive disorder  HTN (hypertension)  HLD (hyperlipidemia)  Helicobacter pylori (H. pylori) infection  Generalized anxiety disorder  Sotomayor's esophagus  Asthma  S/P cholecystectomy  S/P appendectomy  S/P hysterectomy: total  Surgery, elective: right shoulder surgery  S/P knee replacement: bilateral      REVIEW OF SYSTEMS:  CONSTITUTIONAL:  No night sweats.  Mild fatigue,  No fever or chills.  HEENT:  Eyes:  No visual changes.  RESPIRATORY:  No cough.  No wheeze.    No shortness of breath.  CARDIOVASCULAR:  No chest pains.  No palpitations.  GASTROINTESTINAL:  No abdominal pain.  No nausea or vomiting.  No diarrhea   GENITOURINARY:  No urgency.  No frequency.  No dysuria.  No hematuria.    MUSCULOSKELETAL:  left knee pain present    SKIN:  No rashes.      acetaminophen   Tablet .. 650 milliGRAM(s) Oral every 6 hours  ALBUTerol    90 MICROgram(s) HFA Inhaler 2 Puff(s) Inhalation every 6 hours PRN  aluminum hydroxide/magnesium hydroxide/simethicone Suspension 30 milliLiter(s) Oral four times a day PRN  aspirin enteric coated 325 milliGRAM(s) Oral two times a day  atorvastatin 20 milliGRAM(s) Oral at bedtime  BUpivacaine liposome 1.3% Injectable (no eMAR) 20 milliLiter(s) Local Injection once  docusate sodium 100 milliGRAM(s) Oral three times a day  ergocalciferol 46708 Unit(s) Oral every week  hydrochlorothiazide 12.5 milliGRAM(s) Oral daily  ketorolac   Injectable 15 milliGRAM(s) IV Push every 6 hours  lactated ringers. 1000 milliLiter(s) IV Continuous <Continuous>  losartan 50 milliGRAM(s) Oral daily  magnesium hydroxide Suspension 30 milliLiter(s) Oral daily PRN  morphine  - Injectable 2 milliGRAM(s) IV Push every 4 hours PRN  ondansetron Injectable 4 milliGRAM(s) IV Push every 6 hours PRN  oxyCODONE    IR 5 milliGRAM(s) Oral every 4 hours PRN  oxyCODONE    IR 10 milliGRAM(s) Oral every 4 hours PRN  pantoprazole    Tablet 40 milliGRAM(s) Oral before breakfast  polyethylene glycol 3350 17 Gram(s) Oral daily  ranitidine  Oral Tab/Cap - Peds 300 milliGRAM(s) Oral daily  senna 2 Tablet(s) Oral at bedtime PRN  sertraline 100 milliGRAM(s) Oral daily  sodium chloride 0.9% lock flush 3 milliLiter(s) IV Push every 8 hours      Allergies    penicillin (Rhinitis; Rash; Hives)  tetracycline (Rhinitis; Rash; Hives)    Intolerances        SOCIAL HISTORY:  no tob     FAMILY HISTORY:  nc    Vital Signs Last 24 Hrs  T(C): 36.6 (02 May 2019 04:40), Max: 36.6 (02 May 2019 04:40)  T(F): 97.8 (02 May 2019 04:40), Max: 97.8 (02 May 2019 04:40)  HR: 61 (02 May 2019 04:40) (54 - 77)  BP: 122/71 (02 May 2019 05:36) (94/60 - 140/76)  BP(mean): 88 (01 May 2019 19:57) (71 - 88)  RR: 17 (02 May 2019 04:40) (8 - 19)  SpO2: 96% (02 May 2019 04:40) (95% - 100%)    05-01 @ 07:01  -  05-02 @ 06:32  --------------------------------------------------------  IN: 200 mL / OUT: 1250 mL / NET: -1050 mL        PHYSICAL EXAM:   General - NAD, Alert and oriented x 3,   Neck - - No lymphadenopathy  Cardiovascular - RRR no m/r/g, no JVD  Lungs - Clear to auscultation, no use of accessory muscles, no crackles or wheezes.  Skin - No rashes, skin warm and dry, no erythematous areas  Abdomen - Normal bowel sounds, abdomen soft and nontender  Extremeties - No edema,  Neurological – no focal deficits      LABS:                        12.9   6.43  )-----------( 277      ( 01 May 2019 17:56 )             40.4                 RADIOLOGY & ADDITIONAL STUDIES:

## 2019-05-02 NOTE — DISCHARGE NOTE PROVIDER - NSDCFUADDINST_GEN_ALL_CORE_FT
Please follow instructions on Dr. Ortega's separate discharge instructions sheet. Your medications were sent to [facility], located on the first floor of [facility]. Take medications as prescribed.

## 2019-05-02 NOTE — DISCHARGE NOTE PROVIDER - CARE PROVIDER_API CALL
Roberto Ortega)  Orthopaedic Surgery  [address], [address] [place] [address]  Phone: [phone]  Fax: [phone]  Follow Up Time: 2 weeks

## 2019-05-02 NOTE — DISCHARGE NOTE PROVIDER - NSDCCPTREATMENT_GEN_ALL_CORE_FT
PRINCIPAL PROCEDURE  Procedure: Total left knee replacement  Findings and Treatment: left total knee revision

## 2019-05-02 NOTE — DISCHARGE NOTE PROVIDER - NSDCACTIVITY_GEN_ALL_CORE
Walking - Indoors allowed/No heavy lifting/straining/Do not drive or operate machinery/Stairs allowed/Showering allowed/Walking - Outdoors allowed

## 2019-05-02 NOTE — CONSULT NOTE ADULT - ASSESSMENT
57 yo f with HTN. HLD, asthma, who is s/p left TKR revision    1) HTN- acceptable range   - continue home regimen: HCTZ and losartan  2) HLD -continue statin  3) Asthma- controlled- alb PRN dosing  4) Anxiety and Depression-    - stable continue sertraline  5) DVT PPX with ASA bid dosing  6) Hx of RA- not on immunosuppressant medication

## 2019-05-02 NOTE — PROGRESS NOTE ADULT - SUBJECTIVE AND OBJECTIVE BOX
Ortho    Pt comfortable without complaints, pain controlled  Denies CP, SOB, N/V, numbness/tingling     Vital Signs Last 24 Hrs  T(C): 36.6 (02 May 2019 04:40), Max: 36.6 (02 May 2019 04:40)  T(F): 97.8 (02 May 2019 04:40), Max: 97.8 (02 May 2019 04:40)  HR: 61 (02 May 2019 04:40) (54 - 77)  BP: 122/71 (02 May 2019 05:36) (94/60 - 140/76)  BP(mean): 88 (01 May 2019 19:57) (71 - 88)  RR: 17 (02 May 2019 04:40) (8 - 19)  SpO2: 96% (02 May 2019 04:40) (95% - 100%)    General: Pt Alert and oriented, NAD  L knee DSG C/D/I, cryo cuff in place  Pulses: 2+ DP  Sensation: s/s/sp/dp/t intact  Motor: EHL/FHL/TA/GS 5/5    A/P: 58yFemale s/p rev L TKA (poly exchange)  5/1  - Stable  - Pain Control  - DVT ppx: ASA  - Post op abx: VANCO (pcn allergy)   - PT, WBS: WBAT  - f/u AM labs  - dispo: home no needs    Ortho Pager [phone]

## 2019-05-02 NOTE — PROGRESS NOTE ADULT - SUBJECTIVE AND OBJECTIVE BOX
Ortho Note    Pt comfortable without complaints, pain controlled  Denies CP, SOB, N/V, numbness/tingling     Vital Signs Last 24 Hrs  T(C): 36.4 (05-02-19 @ 08:23), Max: 36.4 (05-02-19 @ 08:23)  T(F): 97.6 (05-02-19 @ 08:23), Max: 97.6 (05-02-19 @ 08:23)  HR: 68 (05-02-19 @ 08:23) (68 - 68)  BP: 123/65 (05-02-19 @ 08:23) (123/65 - 123/65)  BP(mean): --  RR: 16 (05-02-19 @ 08:23) (16 - 16)  SpO2: 98% (05-02-19 @ 08:23) (98% - 98%)  AVSS    General: Pt Alert and oriented, NAD  left lower extremity:  DSG- aquacel to left knee CDI  Pulses: +DP, WWP foot  Sensation: SILT  Motor: 5/5  EHL/FHL/TA/GS                          12.3   10.16 )-----------( 293      ( 02 May 2019 06:38 )             39.3   02 May 2019 06:38    138    |  103    |  13     ----------------------------<  210    4.0     |  19     |  0.63     Ca    9.4        02 May 2019 06:38        A/P: 58yFemale POD#1 s/p left total knee revision- polyexchange  - H+H Stable  - Pain Control  - DVT ppx: ASA bid  - PT, WBS: WBAT  - continue bowel regimen  - OOB for meals/ I/S/ ROM exercises  - dispo: home pending PT clearance    Ortho Pager [phone]

## 2019-05-02 NOTE — DISCHARGE NOTE PROVIDER - NSDCCPCAREPLAN_GEN_ALL_CORE_FT
PRINCIPAL DISCHARGE DIAGNOSIS  Diagnosis: Left knee pain  Assessment and Plan of Treatment: improvement s/p left total knee revision

## 2019-05-03 ENCOUNTER — TRANSCRIPTION ENCOUNTER (OUTPATIENT)
Age: 59
End: 2019-05-03

## 2019-05-03 PROCEDURE — 99233 SBSQ HOSP IP/OBS HIGH 50: CPT

## 2019-05-03 RX ADMIN — SODIUM CHLORIDE 3 MILLILITER(S): 9 INJECTION INTRAMUSCULAR; INTRAVENOUS; SUBCUTANEOUS at 07:08

## 2019-05-03 RX ADMIN — CELECOXIB 200 MILLIGRAM(S): 200 CAPSULE ORAL at 18:01

## 2019-05-03 RX ADMIN — OXYCODONE HYDROCHLORIDE 10 MILLIGRAM(S): 5 TABLET ORAL at 18:01

## 2019-05-03 RX ADMIN — Medication 15 MILLIGRAM(S): at 00:37

## 2019-05-03 RX ADMIN — ATORVASTATIN CALCIUM 20 MILLIGRAM(S): 80 TABLET, FILM COATED ORAL at 21:33

## 2019-05-03 RX ADMIN — Medication 650 MILLIGRAM(S): at 12:10

## 2019-05-03 RX ADMIN — Medication 650 MILLIGRAM(S): at 23:10

## 2019-05-03 RX ADMIN — Medication 325 MILLIGRAM(S): at 07:07

## 2019-05-03 RX ADMIN — SODIUM CHLORIDE 3 MILLILITER(S): 9 INJECTION INTRAMUSCULAR; INTRAVENOUS; SUBCUTANEOUS at 21:32

## 2019-05-03 RX ADMIN — POLYETHYLENE GLYCOL 3350 17 GRAM(S): 17 POWDER, FOR SOLUTION ORAL at 11:47

## 2019-05-03 RX ADMIN — OXYCODONE HYDROCHLORIDE 5 MILLIGRAM(S): 5 TABLET ORAL at 05:43

## 2019-05-03 RX ADMIN — Medication 15 MILLIGRAM(S): at 00:07

## 2019-05-03 RX ADMIN — OXYCODONE HYDROCHLORIDE 10 MILLIGRAM(S): 5 TABLET ORAL at 10:30

## 2019-05-03 RX ADMIN — Medication 100 MILLIGRAM(S): at 21:32

## 2019-05-03 RX ADMIN — Medication 325 MILLIGRAM(S): at 17:36

## 2019-05-03 RX ADMIN — Medication 100 MILLIGRAM(S): at 11:47

## 2019-05-03 RX ADMIN — Medication 650 MILLIGRAM(S): at 07:38

## 2019-05-03 RX ADMIN — Medication 650 MILLIGRAM(S): at 11:47

## 2019-05-03 RX ADMIN — OXYCODONE HYDROCHLORIDE 10 MILLIGRAM(S): 5 TABLET ORAL at 17:36

## 2019-05-03 RX ADMIN — PANTOPRAZOLE SODIUM 40 MILLIGRAM(S): 20 TABLET, DELAYED RELEASE ORAL at 07:07

## 2019-05-03 RX ADMIN — OXYCODONE HYDROCHLORIDE 10 MILLIGRAM(S): 5 TABLET ORAL at 10:06

## 2019-05-03 RX ADMIN — CELECOXIB 200 MILLIGRAM(S): 200 CAPSULE ORAL at 07:37

## 2019-05-03 RX ADMIN — Medication 650 MILLIGRAM(S): at 18:00

## 2019-05-03 RX ADMIN — CELECOXIB 200 MILLIGRAM(S): 200 CAPSULE ORAL at 07:07

## 2019-05-03 RX ADMIN — Medication 100 MILLIGRAM(S): at 07:07

## 2019-05-03 RX ADMIN — CELECOXIB 200 MILLIGRAM(S): 200 CAPSULE ORAL at 17:34

## 2019-05-03 RX ADMIN — SODIUM CHLORIDE 3 MILLILITER(S): 9 INJECTION INTRAMUSCULAR; INTRAVENOUS; SUBCUTANEOUS at 11:09

## 2019-05-03 RX ADMIN — OXYCODONE HYDROCHLORIDE 5 MILLIGRAM(S): 5 TABLET ORAL at 06:43

## 2019-05-03 RX ADMIN — SERTRALINE 100 MILLIGRAM(S): 25 TABLET, FILM COATED ORAL at 11:47

## 2019-05-03 RX ADMIN — Medication 650 MILLIGRAM(S): at 17:34

## 2019-05-03 RX ADMIN — Medication 650 MILLIGRAM(S): at 07:08

## 2019-05-03 NOTE — PROGRESS NOTE ADULT - SUBJECTIVE AND OBJECTIVE BOX
Ortho    Pt comfortable without complaints, pain controlled  Denies CP, SOB, N/V, numbness/tingling     Vital Signs Last 24 Hrs  T(C): 36.6 (03 May 2019 05:05), Max: 36.6 (02 May 2019 20:35)  T(F): 97.8 (03 May 2019 05:05), Max: 97.9 (02 May 2019 20:35)  HR: 55 (03 May 2019 05:05) (55 - 73)  BP: 100/62 (03 May 2019 05:05) (90/49 - 123/65)  BP(mean): --  RR: 16 (03 May 2019 05:05) (16 - 17)  SpO2: 95% (03 May 2019 05:05) (95% - 98%)    General: Pt Alert and oriented, NAD  L knee DSG C/D/I, cryo cuff in place  Pulses: 2+ DP  Sensation: s/s/sp/dp/t intact  Motor: EHL/FHL/TA/GS 5/5    A/P: 58yFemale s/p rev L TKA (poly exchange)  5/1  - Stable  - Pain Control  - DVT ppx: ASA  - Post op abx: VANCO (pcn allergy)   - PT, WBS: WBAT  - f/u AM labs  - dispo: home no needs    Ortho Pager [phone]

## 2019-05-03 NOTE — PROGRESS NOTE ADULT - SUBJECTIVE AND OBJECTIVE BOX
INTERVAL HPI/OVERNIGHT EVENTS:  Overall feeling well.  pain controlled    MEDICATIONS  (STANDING):  acetaminophen   Tablet .. 650 milliGRAM(s) Oral every 6 hours  aspirin enteric coated 325 milliGRAM(s) Oral two times a day  atorvastatin 20 milliGRAM(s) Oral at bedtime  BUpivacaine liposome 1.3% Injectable (no eMAR) 20 milliLiter(s) Local Injection once  celecoxib 200 milliGRAM(s) Oral two times a day  docusate sodium 100 milliGRAM(s) Oral three times a day  ergocalciferol 55612 Unit(s) Oral every week  hydrochlorothiazide 12.5 milliGRAM(s) Oral daily  lactated ringers. 1000 milliLiter(s) (100 mL/Hr) IV Continuous <Continuous>  losartan 50 milliGRAM(s) Oral daily  pantoprazole    Tablet 40 milliGRAM(s) Oral before breakfast  polyethylene glycol 3350 17 Gram(s) Oral daily  sertraline 100 milliGRAM(s) Oral daily  sodium chloride 0.9% lock flush 3 milliLiter(s) IV Push every 8 hours    MEDICATIONS  (PRN):  ALBUTerol    90 MICROgram(s) HFA Inhaler 2 Puff(s) Inhalation every 6 hours PRN Shortness of Breath and/or Wheezing  aluminum hydroxide/magnesium hydroxide/simethicone Suspension 30 milliLiter(s) Oral four times a day PRN Indigestion  bisacodyl Suppository 10 milliGRAM(s) Rectal daily PRN If no bowel movement by postoperative day #2  magnesium hydroxide Suspension 30 milliLiter(s) Oral daily PRN Constipation  morphine  - Injectable 2 milliGRAM(s) IV Push every 4 hours PRN Severe Pain (7 - 10)  ondansetron Injectable 4 milliGRAM(s) IV Push every 6 hours PRN Nausea and/or Vomiting  oxyCODONE    IR 5 milliGRAM(s) Oral every 4 hours PRN Mild Pain (1 - 3)  oxyCODONE    IR 10 milliGRAM(s) Oral every 4 hours PRN Moderate Pain (4 - 6)  senna 2 Tablet(s) Oral at bedtime PRN Constipation      Allergies  penicillin (Rhinitis; Rash; Hives)  tetracycline (Rhinitis; Rash; Hives)      REVIEW OF SYSTEMS:  CONSTITUTIONAL:  No night sweats.  Mild fatigue,  No fever or chills.  HEENT:  Eyes:  No visual changes.  RESPIRATORY:  No cough.  No wheeze.    No shortness of breath.  CARDIOVASCULAR:  No chest pains.  No palpitations.  GASTROINTESTINAL:  No abdominal pain.  No nausea or vomiting.  No diarrhea   GENITOURINARY:  No urgency.  No frequency.  No dysuria.  No hematuria.    MUSCULOSKELETAL:  left knee pain present    SKIN:  No rashes.      T(C): 36.6 (05-03-19 @ 05:05), Max: 36.6 (05-02-19 @ 20:35)  HR: 55 (05-03-19 @ 05:05) (55 - 73)  BP: 100/62 (05-03-19 @ 05:05) (90/49 - 123/65)  RR: 16 (05-03-19 @ 05:05) (16 - 17)  SpO2: 95% (05-03-19 @ 05:05) (95% - 98%)  Wt(kg): --    PHYSICAL EXAM:   General - NAD, Alert and oriented x 3,   Neck - - No lymphadenopathy  Cardiovascular - RRR no m/r/g, no JVD  Lungs - Clear to auscultation, no use of accessory muscles, no crackles or wheezes.  Skin - No rashes, skin warm and dry, no erythematous areas  Abdomen - Normal bowel sounds, abdomen soft and nontender  Extremeties - No edema,  Neurological – no focal deficits    LABS:                        12.3   10.16 )-----------( 293      ( 02 May 2019 06:38 )             39.3     05-02    138  |  103  |  13  ----------------------------<  210<H>  4.0   |  19<L>  |  0.63    Ca    9.4      02 May 2019 06:38            RADIOLOGY & ADDITIONAL TESTS:

## 2019-05-03 NOTE — PROGRESS NOTE ADULT - ASSESSMENT
57 yo f with HTN. HLD, asthma, who is s/p left TKR revision    1) HTN-low at times, asymptomatic   - continue home regimen: HCTZ and losartan  2) HLD -continue statin  3) Asthma- controlled- albuterol PRN dosing  4) Anxiety and Depression-    - stable continue sertraline  5) DVT PPX with ASA bid dosing  6) Hx of RA- not on immunosuppressant medication

## 2019-05-03 NOTE — DISCHARGE NOTE NURSING/CASE MANAGEMENT/SOCIAL WORK - NSDCDPATPORTLINK_GEN_ALL_CORE
You can access the [facility] Portal, offered by [facility], by registering with the following website: [email or web address]

## 2019-05-04 VITALS
TEMPERATURE: 97 F | OXYGEN SATURATION: 98 % | SYSTOLIC BLOOD PRESSURE: 107 MMHG | RESPIRATION RATE: 15 BRPM | DIASTOLIC BLOOD PRESSURE: 69 MMHG | HEART RATE: 64 BPM

## 2019-05-04 PROCEDURE — 80048 BASIC METABOLIC PNL TOTAL CA: CPT

## 2019-05-04 PROCEDURE — C1776: CPT

## 2019-05-04 PROCEDURE — 97116 GAIT TRAINING THERAPY: CPT

## 2019-05-04 PROCEDURE — 86803 HEPATITIS C AB TEST: CPT

## 2019-05-04 PROCEDURE — 73560 X-RAY EXAM OF KNEE 1 OR 2: CPT

## 2019-05-04 PROCEDURE — 97161 PT EVAL LOW COMPLEX 20 MIN: CPT

## 2019-05-04 PROCEDURE — 85027 COMPLETE CBC AUTOMATED: CPT

## 2019-05-04 PROCEDURE — 97535 SELF CARE MNGMENT TRAINING: CPT

## 2019-05-04 PROCEDURE — 36415 COLL VENOUS BLD VENIPUNCTURE: CPT

## 2019-05-04 RX ADMIN — CELECOXIB 200 MILLIGRAM(S): 200 CAPSULE ORAL at 06:02

## 2019-05-04 RX ADMIN — Medication 650 MILLIGRAM(S): at 06:02

## 2019-05-04 RX ADMIN — SODIUM CHLORIDE 3 MILLILITER(S): 9 INJECTION INTRAMUSCULAR; INTRAVENOUS; SUBCUTANEOUS at 05:04

## 2019-05-04 RX ADMIN — Medication 325 MILLIGRAM(S): at 05:02

## 2019-05-04 RX ADMIN — PANTOPRAZOLE SODIUM 40 MILLIGRAM(S): 20 TABLET, DELAYED RELEASE ORAL at 05:04

## 2019-05-04 RX ADMIN — CELECOXIB 200 MILLIGRAM(S): 200 CAPSULE ORAL at 05:02

## 2019-05-04 RX ADMIN — Medication 650 MILLIGRAM(S): at 05:02

## 2019-05-04 NOTE — PROGRESS NOTE ADULT - SUBJECTIVE AND OBJECTIVE BOX
Ortho    DC pending PT stair clearance   Pt comfortable without complaints, pain controlled  Denies CP, SOB, N/V, numbness/tingling     Vital Signs Last 24 Hrs  T(C): 36.5 (04 May 2019 04:54), Max: 36.7 (03 May 2019 20:05)  T(F): 97.7 (04 May 2019 04:54), Max: 98 (03 May 2019 20:05)  HR: 58 (04 May 2019 04:54) (58 - 66)  BP: 103/70 (04 May 2019 04:54) (95/58 - 136/72)  BP(mean): --  RR: 16 (04 May 2019 04:54) (16 - 16)  SpO2: 98% (04 May 2019 04:54) (95% - 99%)    General: Pt Alert and oriented, NAD  L knee DSG C/D/I, cryo cuff in place  Pulses: 2+ DP  Sensation: s/s/sp/dp/t intact  Motor: EHL/FHL/TA/GS 5/5    A/P: 58yFemale s/p rev L TKA (poly exchange)  5/1  - Stable  - Pain Control  - DVT ppx: ASA  - Post op abx: VANCO (pcn allergy)   - PT, WBS: WBAT  - f/u AM labs  - dispo: home no needs    Ortho Pager [phone]

## 2019-05-09 DIAGNOSIS — M21.862 OTHER SPECIFIED ACQUIRED DEFORMITIES OF LEFT LOWER LEG: ICD-10-CM

## 2019-05-09 DIAGNOSIS — M06.832: ICD-10-CM

## 2019-05-09 DIAGNOSIS — T84.063A WEAR OF ARTICULAR BEARING SURFACE OF INTERNAL PROSTHETIC LEFT KNEE JOINT, INITIAL ENCOUNTER: ICD-10-CM
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
## 2019-05-09 DIAGNOSIS — M22.8X2 OTHER DISORDERS OF PATELLA, LEFT KNEE: ICD-10-CM

## 2019-05-09 DIAGNOSIS — M06.841 OTHER SPECIFIED RHEUMATOID ARTHRITIS, RIGHT HAND: ICD-10-CM

## 2019-05-09 DIAGNOSIS — M06.842 OTHER SPECIFIED RHEUMATOID ARTHRITIS, LEFT HAND: ICD-10-CM

## 2019-05-09 DIAGNOSIS — M65.9 SYNOVITIS AND TENOSYNOVITIS, UNSPECIFIED: ICD-10-CM

## 2019-05-09 DIAGNOSIS — M06.862 OTHER SPECIFIED RHEUMATOID ARTHRITIS, LEFT KNEE: ICD-10-CM
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
## 2019-05-09 DIAGNOSIS — M06.861 OTHER SPECIFIED RHEUMATOID ARTHRITIS, RIGHT KNEE: ICD-10-CM

## 2019-05-09 DIAGNOSIS — M06.831 OTHER SPECIFIED RHEUMATOID ARTHRITIS, RIGHT WRIST: ICD-10-CM

## 2019-05-09 DIAGNOSIS — I10 ESSENTIAL (PRIMARY) HYPERTENSION: ICD-10-CM

## 2019-05-09 DIAGNOSIS — J45.909 UNSPECIFIED ASTHMA, UNCOMPLICATED: ICD-10-CM

## 2019-05-09 DIAGNOSIS — F41.8 OTHER SPECIFIED ANXIETY DISORDERS: ICD-10-CM

## 2019-05-16 ENCOUNTER — APPOINTMENT (OUTPATIENT)
Dept: ORTHOPEDIC SURGERY | Facility: CLINIC | Age: 59
End: 2019-05-16
Payer: MEDICARE

## 2019-05-16 PROCEDURE — 99024 POSTOP FOLLOW-UP VISIT: CPT

## 2019-05-16 NOTE — ADDENDUM
[FreeTextEntry1] : Dr. Ortega was physically present during the key portions the encounter, provided assistance as needed, and formulated the assessment and plan as documented above.\par \par

## 2019-05-16 NOTE — HISTORY OF PRESENT ILLNESS
[5] : the patient reports pain that is 5/10 in severity [Clean/Dry/Intact] : clean, dry and intact [Doing Well] : is doing well [Excellent Pain Control] : has excellent pain control [No Sign of Infection] : is showing no signs of infection [None] : None [Healed] : healed [Neuro Intact] : an unremarkable neurological exam [Negative Vasquez's] : maneuvers demonstrated a negative Vasquez's sign [Vascular Intact] : ~T peripheral vascular exam normal [Chills] : no chills [Fever] : no fever [Nausea] : no nausea [Vomiting] : no vomiting [Erythema] : not erythematous [Discharge] : absent of discharge [Dehiscence] : not dehisced [de-identified] : s/p Left TKR Revision DOS: 5/1/19 [de-identified] : Patient presents for first post op s/p left TKA poly -exchange 5/1/19. She reports improvement compared with prior to surgery. Her pain is well controlled with Percocet. She is ambulating with a cane and doing home PT.  [de-identified] : Patient is alert and oriented x3,\par Left knee: Well-healed surgical scar without erythema or ecchymosis. Acceptable post op swelling. No laxity noted. ROM from 5 degree flexion contracture to 90 degrees of flexion. Calf soft and nontender. NVI. Quad power 5/5. [de-identified] : No new images taken today [de-identified] : Patient is progressing well  2 weeks s/p left TKA poly exchange. Advised to begin outpatient PT. Exercises demonstrated in office to achieve full extension and improve flexion. COntinue ASA BID until 4 weeks post op. Follow up in 4 weeks.

## 2019-06-10 ENCOUNTER — FORM ENCOUNTER (OUTPATIENT)
Age: 59
End: 2019-06-10

## 2019-06-11 ENCOUNTER — APPOINTMENT (OUTPATIENT)
Dept: ORTHOPEDIC SURGERY | Facility: CLINIC | Age: 59
End: 2019-06-11
Payer: MEDICARE

## 2019-06-11 ENCOUNTER — OUTPATIENT (OUTPATIENT)
Dept: OUTPATIENT SERVICES | Facility: HOSPITAL | Age: 59
LOS: 1 days | End: 2019-06-11
Payer: MEDICARE

## 2019-06-11 DIAGNOSIS — Z41.9 ENCOUNTER FOR PROCEDURE FOR PURPOSES OTHER THAN REMEDYING HEALTH STATE, UNSPECIFIED: Chronic | ICD-10-CM

## 2019-06-11 DIAGNOSIS — M25.462 EFFUSION, LEFT KNEE: ICD-10-CM

## 2019-06-11 DIAGNOSIS — Z96.659 PRESENCE OF UNSPECIFIED ARTIFICIAL KNEE JOINT: Chronic | ICD-10-CM

## 2019-06-11 DIAGNOSIS — Z90.49 ACQUIRED ABSENCE OF OTHER SPECIFIED PARTS OF DIGESTIVE TRACT: Chronic | ICD-10-CM

## 2019-06-11 DIAGNOSIS — T84.063A: ICD-10-CM

## 2019-06-11 DIAGNOSIS — M65.9 SYNOVITIS AND TENOSYNOVITIS, UNSPECIFIED: ICD-10-CM

## 2019-06-11 DIAGNOSIS — Z90.710 ACQUIRED ABSENCE OF BOTH CERVIX AND UTERUS: Chronic | ICD-10-CM

## 2019-06-11 PROCEDURE — 73564 X-RAY EXAM KNEE 4 OR MORE: CPT

## 2019-06-11 PROCEDURE — 99024 POSTOP FOLLOW-UP VISIT: CPT

## 2019-06-11 PROCEDURE — 73564 X-RAY EXAM KNEE 4 OR MORE: CPT | Mod: 26,LT

## 2019-06-11 RX ORDER — ETANERCEPT 50 MG/ML
50 SOLUTION SUBCUTANEOUS
Refills: 0 | Status: ACTIVE | COMMUNITY

## 2019-06-11 RX ORDER — OXYCODONE AND ACETAMINOPHEN 5; 325 MG/1; MG/1
5-325 TABLET ORAL
Qty: 50 | Refills: 0 | Status: COMPLETED | COMMUNITY
Start: 2019-05-01 | End: 2019-05-16

## 2019-06-11 RX ORDER — OXYCODONE HYDROCHLORIDE 10 MG/1
10 TABLET, FILM COATED, EXTENDED RELEASE ORAL
Qty: 14 | Refills: 0 | Status: COMPLETED | COMMUNITY
Start: 2019-05-01 | End: 2019-05-16

## 2019-06-11 RX ORDER — CELECOXIB 200 MG/1
200 CAPSULE ORAL TWICE DAILY
Qty: 84 | Refills: 0 | Status: DISCONTINUED | COMMUNITY
Start: 2019-05-01 | End: 2019-06-11

## 2019-06-11 RX ORDER — OXYCODONE AND ACETAMINOPHEN 5; 325 MG/1; MG/1
5-325 TABLET ORAL TWICE DAILY
Qty: 60 | Refills: 0 | Status: ACTIVE | COMMUNITY
Start: 2019-05-16 | End: 1900-01-01

## 2019-06-11 RX ORDER — ASPIRIN/ACETAMINOPHEN/CAFFEINE 500-325-65
325 POWDER IN PACKET (EA) ORAL
Qty: 60 | Refills: 0 | Status: DISCONTINUED | COMMUNITY
Start: 2019-05-01 | End: 2019-06-11

## 2019-06-11 RX ORDER — MORPHINE SULFATE 15 MG/1
15 TABLET, FILM COATED, EXTENDED RELEASE ORAL
Qty: 14 | Refills: 0 | Status: DISCONTINUED | COMMUNITY
Start: 2019-05-02 | End: 2019-06-11

## 2019-06-11 NOTE — END OF VISIT
[FreeTextEntry3] : All medical record entries made by Flower Hooper acting as a scribe for the performing provider (Roberto Ortega MD and/or DEVORAH Almanzar) on 06/11/2019. All entries were dictated to me by the performing medical provider. In signing this record, the medical provider affirms that they have personally performed the history, physical exam, assessment and plan and have also directed, reviewed and agreed to the documentation in the chart.

## 2019-06-11 NOTE — HISTORY OF PRESENT ILLNESS
[Clean/Dry/Intact] : clean, dry and intact [Healed] : healed [Doing Well] : is doing well [No Sign of Infection] : is showing no signs of infection [Excellent Pain Control] : has excellent pain control [Fever] : no fever [Chills] : no chills [de-identified] : second post op revision left total knee replacement, surgical date 5/1/19 [de-identified] : 58 year old female presents for second post op s/p revision left TKR 5/1/19. She reports moderate, continual left knee pain along with mild stiffness and instability on stairs. She also reports instability in the right knee on stairs. Patient can walk 5-10 blocks with a slight limp and a cane. She uses a rail to ascend and descend stairs. Patient reports that she takes Oxycodone 4 times a day.\par Ms. Diaz had a flare up of rheumatoid arthritis about 3 weeks ago that caused severe pain throughout her body. She reports that she has an appointment to see her rheumatologist next week. She has not been attending physical therapy because of her RA flare up and she also reports that she tried to go to two different PT facilities but she did not like them. [de-identified] : X-ray imaging of the left knee done here today shows a well-fixed left TKR in overall good alignment.\par  [de-identified] : Patient is alert and oriented x3.\par Left knee: Well-healed surgical scar without erythema or ecchymosis. Acceptable post-op swelling. No instability. ROM from near full extension to 93 degrees of flexion. \par Calf is soft and nontender.\par NVI. [de-identified] : Ms. Diaz is a 57 y/o F who is doing well s/p left revision to TKR. I wrote a physical therapy prescription and stressed the importance of attending PT to regain knee ROM. I refilled her Oxycodone prescription for her to take 2 x a day but advised her to wean off of narcotic medication and to ask her rheumatologist if there is an anti-inflammatory medication she can take for her knee pain.\par Follow up in 2 months

## 2019-07-28 ENCOUNTER — FORM ENCOUNTER (OUTPATIENT)
Age: 59
End: 2019-07-28

## 2019-08-22 ENCOUNTER — APPOINTMENT (OUTPATIENT)
Dept: ORTHOPEDIC SURGERY | Facility: CLINIC | Age: 59
End: 2019-08-22

## 2020-09-28 NOTE — PHYSICAL THERAPY INITIAL EVALUATION ADULT - MODALITIES TREATMENT COMMENTS
ED Supine, bilateral LEs: glute sets, quad sets, heel slides, ankle pumps (all through full range, 1 set, 10 reps). Patient tolerated therex well. Educated patient to perform therex regimen 3-5x/day, patient verbalized understanding; written handout provided.

## 2022-08-09 ENCOUNTER — RESULT REVIEW (OUTPATIENT)
Age: 62
End: 2022-08-09

## 2022-08-09 ENCOUNTER — OUTPATIENT (OUTPATIENT)
Dept: OUTPATIENT SERVICES | Facility: HOSPITAL | Age: 62
LOS: 1 days | End: 2022-08-09
Payer: MEDICARE

## 2022-08-09 ENCOUNTER — APPOINTMENT (OUTPATIENT)
Dept: ORTHOPEDIC SURGERY | Facility: CLINIC | Age: 62
End: 2022-08-09

## 2022-08-09 ENCOUNTER — LABORATORY RESULT (OUTPATIENT)
Age: 62
End: 2022-08-09

## 2022-08-09 VITALS
DIASTOLIC BLOOD PRESSURE: 96 MMHG | HEART RATE: 75 BPM | SYSTOLIC BLOOD PRESSURE: 150 MMHG | OXYGEN SATURATION: 96 % | BODY MASS INDEX: 31.18 KG/M2 | HEIGHT: 66 IN | WEIGHT: 194 LBS

## 2022-08-09 DIAGNOSIS — Z90.49 ACQUIRED ABSENCE OF OTHER SPECIFIED PARTS OF DIGESTIVE TRACT: Chronic | ICD-10-CM

## 2022-08-09 DIAGNOSIS — Z47.1 AFTERCARE FOLLOWING JOINT REPLACEMENT SURGERY: ICD-10-CM

## 2022-08-09 DIAGNOSIS — T84.052D: ICD-10-CM

## 2022-08-09 DIAGNOSIS — Z90.710 ACQUIRED ABSENCE OF BOTH CERVIX AND UTERUS: Chronic | ICD-10-CM

## 2022-08-09 DIAGNOSIS — Z96.659 PRESENCE OF UNSPECIFIED ARTIFICIAL KNEE JOINT: Chronic | ICD-10-CM

## 2022-08-09 DIAGNOSIS — Z41.9 ENCOUNTER FOR PROCEDURE FOR PURPOSES OTHER THAN REMEDYING HEALTH STATE, UNSPECIFIED: Chronic | ICD-10-CM

## 2022-08-09 DIAGNOSIS — Z96.652 AFTERCARE FOLLOWING JOINT REPLACEMENT SURGERY: ICD-10-CM

## 2022-08-09 PROCEDURE — 20610 DRAIN/INJ JOINT/BURSA W/O US: CPT

## 2022-08-09 PROCEDURE — 71046 X-RAY EXAM CHEST 2 VIEWS: CPT

## 2022-08-09 PROCEDURE — 99205 OFFICE O/P NEW HI 60 MIN: CPT | Mod: 25

## 2022-08-09 PROCEDURE — 73564 X-RAY EXAM KNEE 4 OR MORE: CPT

## 2022-08-09 PROCEDURE — 72170 X-RAY EXAM OF PELVIS: CPT

## 2022-08-09 PROCEDURE — 71046 X-RAY EXAM CHEST 2 VIEWS: CPT | Mod: 26

## 2022-08-09 PROCEDURE — 72170 X-RAY EXAM OF PELVIS: CPT | Mod: 26

## 2022-08-09 PROCEDURE — 73564 X-RAY EXAM KNEE 4 OR MORE: CPT | Mod: 26,50

## 2022-08-10 RX ORDER — IBUPROFEN 600 MG/1
600 TABLET, FILM COATED ORAL
Qty: 20 | Refills: 0 | Status: ACTIVE | COMMUNITY
Start: 2022-03-18

## 2022-08-10 RX ORDER — CLINDAMYCIN HYDROCHLORIDE 300 MG/1
300 CAPSULE ORAL
Qty: 30 | Refills: 0 | Status: ACTIVE | COMMUNITY
Start: 2022-04-01

## 2022-08-10 RX ORDER — SERTRALINE HYDROCHLORIDE 100 MG/1
100 TABLET, FILM COATED ORAL
Qty: 90 | Refills: 0 | Status: ACTIVE | COMMUNITY
Start: 2022-02-18

## 2022-08-10 RX ORDER — ALBUTEROL SULFATE 90 UG/1
108 (90 BASE) INHALANT RESPIRATORY (INHALATION)
Qty: 18 | Refills: 0 | Status: ACTIVE | COMMUNITY
Start: 2022-06-03

## 2022-08-10 RX ORDER — FLUTICASONE PROPIONATE AND SALMETEROL XINAFOATE 230; 21 UG/1; UG/1
230-21 AEROSOL, METERED RESPIRATORY (INHALATION)
Qty: 12 | Refills: 0 | Status: ACTIVE | COMMUNITY
Start: 2022-06-03

## 2022-08-10 RX ORDER — LOSARTAN POTASSIUM AND HYDROCHLOROTHIAZIDE 12.5; 5 MG/1; MG/1
50-12.5 TABLET ORAL
Qty: 90 | Refills: 0 | Status: ACTIVE | COMMUNITY
Start: 2022-06-23

## 2022-08-10 RX ORDER — IBUPROFEN 800 MG/1
800 TABLET, FILM COATED ORAL
Qty: 15 | Refills: 0 | Status: ACTIVE | COMMUNITY
Start: 2022-04-01

## 2022-08-10 RX ORDER — ROSUVASTATIN CALCIUM 10 MG/1
10 TABLET, FILM COATED ORAL
Qty: 90 | Refills: 0 | Status: ACTIVE | COMMUNITY
Start: 2022-02-18

## 2022-08-10 RX ORDER — ETANERCEPT 50 MG/ML
50 SOLUTION SUBCUTANEOUS
Qty: 4 | Refills: 0 | Status: ACTIVE | COMMUNITY
Start: 2022-06-02

## 2022-08-10 NOTE — HISTORY OF PRESENT ILLNESS
[de-identified] : B TKA about 13 years ago, OSH\par L knee synovectomy and poly exchange 5/1/19, Dr. Ortega\par \par 8/9/22: 62y/o female presenting for evaluation of right knee pain and swelling. Notes worsening symptoms over the pandemic. Has some occasional buckling of the right knee. Left knee already underwent synovectomy and poly exchange for synovitis associated with polyethylene wear. Right knee symptoms feel similar to the left knee pre-procedure. The left knee is doing well and she has no complaints with it.\par \par PMH: HTN, HLD, RA. Sees an outside rheumatologist and is currently taking Enbrel. [Worsening] : worsening [6] : a current pain level of 6/10 [Bending] : worsened by bending [Walking] : worsened by walking [None] : No relieving factors are noted

## 2022-08-10 NOTE — DISCUSSION/SUMMARY
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 We discussed the likelihood of satisfaction after complete recovery, and the potential causes of dissatisfaction. The importance of active patient participation in the rehabilitation protocol was emphasized, along with its influence on short and long-term outcomes. Specific risks of total knee replacement were discussed in detail. We discussed the risk of surgical site complications including but not limited to: surgical site infection, wound healing complications, bone fracture, tendon or ligament injury, neurovascular injury, hemorrhage, postoperative stiffness or instability, persistent pain and need for reoperation or manipulation under anesthesia. We discussed surgical blood loss and the possible need for blood transfusion. We discussed the risk of perioperative medical complications, including but not limited to catheter-associated urinary tract infection, venous thromboembolism and other cardiopulmonary complications. We discussed anesthetic options and the risk of anesthesia-related complications. We discussed the durability of prosthetic knees and limitations related to wear, osteolysis and loosening.  All questions were answered the patient's satisfaction. I asked the patient to either call back or schedule a followup appointment for any additional questions or concerns regarding the procedure.\par - Right knee aspiration today, fluid sent for infection panel\par - Book for surgery at a convenient time, likely late Sept per her request\par - Medical clearance as well as a Rheumatology note (re: Enbrel) preoperatively

## 2022-08-10 NOTE — PROCEDURE
[de-identified] : Procedure: Knee joint aspiration\par Laterality: right\par Indication: diagnostic - discussed with patient\par Skin prep: alcohol and chlorhexidine\par Anesthesia: ethyl chloride spray\par Needle: 18-gauge\par Portal: superolateral\par Aspiration: 55cc normal appearing synovial fluid\par Injectate: none\par Dressing: Band-aid\par Complications: None\par

## 2022-08-10 NOTE — PHYSICAL EXAM
[de-identified] : General appearance: well nourished and hydrated, pleasant, alert and oriented x 3, cooperative.  \par HEENT: normocephalic, EOM intact, wearing mask, external auditory canal clear.  \par Cardiovascular: no lower leg edema, no varicosities, dorsalis pedis pulses palpable and symmetric.  \par Lymphatics: no palpable lymphadenopathy, no lymphedema.  \par Neurologic: sensation is normal, no muscle weakness in upper or lower extremities, patella tendon reflexes present and symmetric.  \par Dermatologic: skin moist, warm, no rash.  \par Spine: cervical spine with normal lordosis and painless range of motion, thoracic spine with normal kyphosis and painless range of motion, lumbosacral spine with normal lordosis and painless range of motion.\par Gait: right antalgia and caution.\par \par Left knee: all fields negative/normal/unremarkable unless otherwise noted -- \par - Focal soft tissue swelling: \par - Ecchymosis: \par - Erythema: \par - Effusion: moderate\par - Wounds: healed midline incision\par - Alignment: \par - Tenderness: \par - ROM: 0-90\par - Collateral laxity: \par - Cruciate laxity: \par - Popliteal angle (degrees): \par - Quad strength: \par \par Right knee: all fields negative/normal/unremarkable unless otherwise noted --\par - Focal soft tissue swelling: suprapatellar\par - Ecchymosis: \par - Erythema: \par - Effusion: large\par - Wounds: healed midline incision\par - Alignment: \par - Tenderness: \par - ROM: 5 hyper - 125\par - Collateral laxity: \par - Cruciate laxity: 10mm a/p shift on flexion drawers\par - Popliteal angle (degrees): \par - Quad strength:  [de-identified] : AP pelvis and 4 views of the bilateral knees (weightbearing AP, weightbearing Schafer, weightbearing lateral, and Sunrise) were interpreted by me and reviewed with the patient.\par \par Location of imaging: [facility]\par Date of exam: 8/9/22\par \par Pelvic alignment: relative outlet\par \par Right hip -- \par Arthritis: none\par Deformity: none\par Osteonecrosis: none\par \par Left hip -- \par Arthritis: none\par Deformity: none\par Osteonecrosis: none\par \par Right knee -- TKA in position. All components appear to be well fixed in reasonable alignment. There is some evidence of proximal medial tibial osteolysis which has not progressed significantly from prior films in the system. Patella sits at appropriate height and displays mild lateral subluxation. \par \par Left knee -- TKA in position. All components appear to be well fixed in reasonable alignment without evidence of mechanical complication as compared to [facility] [place]. Patella sits at appropriate height and displays mild medial tilt.

## 2022-08-18 ENCOUNTER — NON-APPOINTMENT (OUTPATIENT)
Age: 62
End: 2022-08-18

## 2022-08-18 LAB
B PERT IGG+IGM PNL SER: ABNORMAL
COLOR FLD: YELLOW
EOSINOPHIL # FLD MANUAL: 0 %
FLUID INTAKE SUBSTANCE CLASS: NORMAL
LYMPHOCYTES # FLD MANUAL: 35 %
MESOTHL CELL NFR FLD: 0 %
MONOS+MACROS NFR FLD MANUAL: 19 %
NEUTS SEG # FLD MANUAL: 46 %
NRBC # FLD: 0 %
RBC # FLD MANUAL: 3000 /UL
SYCRY CLARITY: ABNORMAL
SYCRY COLOR: ABNORMAL
SYCRY ID: NORMAL
SYCRY TUBE: NORMAL
TOTAL CELLS COUNTED FLD: 2987 /UL
TUBE TYPE: NORMAL
UNIDENT CELLS NFR FLD MANUAL: 0 %
VARIANT LYMPHS # FLD MANUAL: 0 %

## 2022-09-29 ENCOUNTER — APPOINTMENT (OUTPATIENT)
Dept: ORTHOPEDIC SURGERY | Facility: HOSPITAL | Age: 62
End: 2022-09-29

## 2024-11-01 DIAGNOSIS — I71.21 ANEURYSM OF THE ASCENDING AORTA, WITHOUT RUPTURE: ICD-10-CM

## 2024-11-08 ENCOUNTER — OUTPATIENT (OUTPATIENT)
Dept: OUTPATIENT SERVICES | Facility: HOSPITAL | Age: 64
LOS: 1 days | End: 2024-11-08

## 2024-11-08 DIAGNOSIS — R06.09 OTHER FORMS OF DYSPNEA: ICD-10-CM

## 2024-11-08 DIAGNOSIS — Z90.49 ACQUIRED ABSENCE OF OTHER SPECIFIED PARTS OF DIGESTIVE TRACT: Chronic | ICD-10-CM

## 2024-11-08 DIAGNOSIS — Z41.9 ENCOUNTER FOR PROCEDURE FOR PURPOSES OTHER THAN REMEDYING HEALTH STATE, UNSPECIFIED: Chronic | ICD-10-CM

## 2024-11-08 DIAGNOSIS — Z90.710 ACQUIRED ABSENCE OF BOTH CERVIX AND UTERUS: Chronic | ICD-10-CM

## 2024-11-08 DIAGNOSIS — Z96.659 PRESENCE OF UNSPECIFIED ARTIFICIAL KNEE JOINT: Chronic | ICD-10-CM

## 2024-11-08 PROCEDURE — 93017 CV STRESS TEST TRACING ONLY: CPT

## 2024-11-08 PROCEDURE — A9500: CPT

## 2024-11-08 PROCEDURE — 78452 HT MUSCLE IMAGE SPECT MULT: CPT

## 2024-11-12 ENCOUNTER — NON-APPOINTMENT (OUTPATIENT)
Age: 64
End: 2024-11-12

## 2024-11-12 DIAGNOSIS — Z82.3 FAMILY HISTORY OF STROKE: ICD-10-CM

## 2024-11-20 ENCOUNTER — NON-APPOINTMENT (OUTPATIENT)
Age: 64
End: 2024-11-20

## 2024-11-20 ENCOUNTER — APPOINTMENT (OUTPATIENT)
Dept: CARDIOTHORACIC SURGERY | Facility: CLINIC | Age: 64
End: 2024-11-20
Payer: MEDICARE

## 2024-11-20 VITALS
BODY MASS INDEX: 30.37 KG/M2 | TEMPERATURE: 97.3 F | HEART RATE: 69 BPM | SYSTOLIC BLOOD PRESSURE: 145 MMHG | HEIGHT: 66 IN | OXYGEN SATURATION: 97 % | DIASTOLIC BLOOD PRESSURE: 76 MMHG | WEIGHT: 189 LBS

## 2024-11-20 DIAGNOSIS — Z78.9 OTHER SPECIFIED HEALTH STATUS: ICD-10-CM

## 2024-11-20 DIAGNOSIS — I71.21 ANEURYSM OF THE ASCENDING AORTA, WITHOUT RUPTURE: ICD-10-CM

## 2024-11-20 PROCEDURE — 99203 OFFICE O/P NEW LOW 30 MIN: CPT

## 2024-11-21 PROBLEM — Z78.9 DOES NOT USE TOBACCO: Status: ACTIVE | Noted: 2024-11-21

## 2024-11-21 RX ORDER — HYDRALAZINE HYDROCHLORIDE 25 MG/1
25 TABLET ORAL TWICE DAILY
Refills: 0 | Status: ACTIVE | COMMUNITY

## 2025-02-26 RX ORDER — CAMPHOR 0.45 %
25 GEL (GRAM) TOPICAL
Qty: 2 | Refills: 0 | Status: ACTIVE | COMMUNITY
Start: 2025-02-26 | End: 1900-01-01

## 2025-02-26 RX ORDER — PREDNISONE 50 MG/1
50 TABLET ORAL
Qty: 3 | Refills: 1 | Status: ACTIVE | COMMUNITY
Start: 2025-02-26 | End: 1900-01-01